# Patient Record
Sex: FEMALE | Race: WHITE | NOT HISPANIC OR LATINO
[De-identification: names, ages, dates, MRNs, and addresses within clinical notes are randomized per-mention and may not be internally consistent; named-entity substitution may affect disease eponyms.]

---

## 2022-04-29 PROBLEM — Z00.00 ENCOUNTER FOR PREVENTIVE HEALTH EXAMINATION: Status: ACTIVE | Noted: 2022-04-29

## 2022-06-13 ENCOUNTER — TRANSCRIPTION ENCOUNTER (OUTPATIENT)
Age: 62
End: 2022-06-13

## 2022-06-13 ENCOUNTER — APPOINTMENT (OUTPATIENT)
Dept: NEUROSURGERY | Facility: CLINIC | Age: 62
End: 2022-06-13
Payer: SELF-PAY

## 2022-06-13 PROCEDURE — EDU01: CPT

## 2022-06-14 ENCOUNTER — APPOINTMENT (OUTPATIENT)
Dept: NEUROSURGERY | Facility: CLINIC | Age: 62
End: 2022-06-14

## 2022-06-15 NOTE — HISTORY OF PRESENT ILLNESS
[de-identified] : I conducted a remote educational consult with CASPER VEGA on 06/13/2022. I explained that I am only able to provide an educational consult and not render treatment as I am not licensed in the state in which [place] is located. A written informed consent for the educational consult was obtained and is included in the EHR. CASPER VEGA is informed that there would be a $250 cost associated with the conduct of the remote educational consult. \par \par Ms. VEGA is a pleasant 61 year old female who presents today with a chief complaint of bilateral (R>L) ear pulsatile tinnitus.  It first started 2009.  Since that time it has been getting progressively worse.  It is described as a constant, whooshing sound that is in sync with her pulse.  Pressing on either side of her neck does not change the sound.\par \par She has been seen by ENT in the past and had high frequency hearing loss as per patient.  She was referred to Dr. Villagran. \par \par She has a history of headaches which have also been getting worse over the last couple of years.  They are worse with laying flat.\par \par She denies any blurry vision or diplopia.\par \par She has never had a lumbar puncture.

## 2022-06-15 NOTE — REASON FOR VISIT
[Home] : at home, [unfilled] , at the time of the visit. [Medical Office: ([facility])___] : at the medical office located in  [Patient] : the patient [Self] : self [New Patient Visit] : a new patient visit [FreeTextEntry1] : Pulsatile Tinnitus

## 2022-06-15 NOTE — ASSESSMENT
[FreeTextEntry1] : Impression:\par Bilateral (R>L) Pulsatile Tinnitus\par NO CHANGE with neck pressure on either side\par Chronic Headaches\par No Vision Complaints\par \par We discussed possible causes of pulsatile tinnitus including:\par ENT causes such as semicircular canal dehiscence, tumor, ototoxicity\par Cardiac causes such as aortic stenosis, valve disease, HTN, other causes of heart murmur\par Anemia\par Thyroid disease\par Cerebrovascular causes such as arteriovenous malformation (AVM), dural arteriovenous fistula (dAVF), venous sinus stenosis, venous aneurysm, large trans-mastoid emissary vein, carotid stenosis, jugular bulb diverticulum \par \par MRA 4/2022 reveals no dural AVF or AVM\par \par I reassured her that based on the MRA there is no life threatening cause of her pulsatile tinnitus.  She needs further imaging to assess the intracranial venous system. \par \par Plan:\par MRV Head w/wo to r/o venous sinus stenosis or venous aneurysm\par Follow Up with Me After Imaging

## 2022-08-09 ENCOUNTER — APPOINTMENT (OUTPATIENT)
Dept: NEUROSURGERY | Facility: CLINIC | Age: 62
End: 2022-08-09

## 2022-08-09 PROCEDURE — 99442: CPT

## 2022-08-16 ENCOUNTER — TRANSCRIPTION ENCOUNTER (OUTPATIENT)
Age: 62
End: 2022-08-16

## 2022-09-01 NOTE — REASON FOR VISIT
[Home] : at home, [unfilled] , at the time of the visit. [Medical Office: ([facility])___] : at the medical office located in  [Verbal consent obtained from patient] : the patient, [unfilled] [Follow-Up: _____] : a [unfilled] follow-up visit [FreeTextEntry1] : Review results of MRV Head w/wo to r/o venous sinus stenosis or venous aneurysm
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
## 2022-09-01 NOTE — ASSESSMENT
[FreeTextEntry1] : Impression:\par Bilateral (R>L) Pulsatile Tinnitus\par NO CHANGE with neck pressure on either side\par Chronic Headaches\par No Vision Complaints\par \par We discussed possible causes of pulsatile tinnitus including:\par ENT causes such as semicircular canal dehiscence, tumor, ototoxicity\par Cardiac causes such as aortic stenosis, valve disease, HTN, other causes of heart murmur\par Anemia\par Thyroid disease\par Cerebrovascular causes such as arteriovenous malformation (AVM), dural arteriovenous fistula (dAVF), venous sinus stenosis, venous aneurysm, large trans-mastoid emissary vein, carotid stenosis, jugular bulb diverticulum \par \par MRA 4/2022 reveals no dural AVF or AVM\par I reassured her that based on the MRA there is no life threatening cause of her pulsatile tinnitus.  \par MRV 4/2022 imaging unclear\par \par Plan:\par Repeat MRV Head w/wo to r/o venous sinus stenosis or venous aneurysm\par Recommend Physical Therapy of the cervical spine and TMJ, to improve the alignment of the cervical spine\par Recommend maxillofacial release therapy (MFRT)\par Follow Up with Me After Imaging and PT

## 2022-12-08 ENCOUNTER — NON-APPOINTMENT (OUTPATIENT)
Age: 62
End: 2022-12-08
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
## 2022-12-08 NOTE — ASSESSMENT
[FreeTextEntry1] : Impression:\par Bilateral (R>L) Pulsatile Tinnitus\par NO CHANGE with neck pressure on either side\par Chronic Headaches\par Worsening Dizziness\par \par MRA 4/2022 reveals no dural AVF or AVM\par I reassured her that based on the MRA there is no life threatening cause of her pulsatile tinnitus.  \par MRV 4/2022 imaging unclear\par \par MRV 10/2022 reveals a LEFT dominant system, no transverse sinus stenosis; left jugular bulb diverticulum\par \par While a jugular bulb diverticulum can cause pulsatile tinnitus, it is usually unilateral or at least worse on the side of the diverticulum.  I reassured her that there is no life threatening cause of pulsatile tinnitus noted.  I recommend continued follow up with ENT, specifically neuro-otology, for worsening dizziness and pulsatile tinnitus.\par \par If the pulsatile tinnitus worsens or becomes debilitating, we discussed the possibility of further work up with catheter angiogram/venogram/BTO.  I would like her to exhaust ENT possibilities before proceeding.  \par \par Plan:\par Evaluation by Neuro-Otology\par Follow Up with Me As Needed If She is Considering Catheter Venogram/Angiogram/BTO\par \par Dr. Epperson discussed results and recommendations with patient over the phone.

## 2023-06-13 ENCOUNTER — OUTPATIENT (OUTPATIENT)
Dept: OUTPATIENT SERVICES | Facility: HOSPITAL | Age: 63
LOS: 1 days | End: 2023-06-13
Payer: COMMERCIAL

## 2023-06-13 ENCOUNTER — APPOINTMENT (OUTPATIENT)
Dept: NEUROSURGERY | Facility: CLINIC | Age: 63
End: 2023-06-13
Payer: COMMERCIAL

## 2023-06-13 VITALS
OXYGEN SATURATION: 97 % | DIASTOLIC BLOOD PRESSURE: 85 MMHG | SYSTOLIC BLOOD PRESSURE: 129 MMHG | WEIGHT: 169.98 LBS | HEIGHT: 66 IN | HEART RATE: 66 BPM | RESPIRATION RATE: 15 BRPM | TEMPERATURE: 98 F

## 2023-06-13 DIAGNOSIS — Z98.890 OTHER SPECIFIED POSTPROCEDURAL STATES: Chronic | ICD-10-CM

## 2023-06-13 DIAGNOSIS — Z90.49 ACQUIRED ABSENCE OF OTHER SPECIFIED PARTS OF DIGESTIVE TRACT: Chronic | ICD-10-CM

## 2023-06-13 DIAGNOSIS — H93.A9 PULSATILE TINNITUS, UNSPECIFIED EAR: ICD-10-CM

## 2023-06-13 DIAGNOSIS — Z87.81 PERSONAL HISTORY OF (HEALED) TRAUMATIC FRACTURE: Chronic | ICD-10-CM

## 2023-06-13 DIAGNOSIS — Z96.652 PRESENCE OF LEFT ARTIFICIAL KNEE JOINT: Chronic | ICD-10-CM

## 2023-06-13 DIAGNOSIS — Z01.818 ENCOUNTER FOR OTHER PREPROCEDURAL EXAMINATION: ICD-10-CM

## 2023-06-13 DIAGNOSIS — Z90.721 ACQUIRED ABSENCE OF OVARIES, UNILATERAL: Chronic | ICD-10-CM

## 2023-06-13 LAB
ANION GAP SERPL CALC-SCNC: 12 MMOL/L — SIGNIFICANT CHANGE UP (ref 5–17)
APTT BLD: 30.9 SEC — SIGNIFICANT CHANGE UP (ref 27.5–35.5)
BLD GP AB SCN SERPL QL: NEGATIVE — SIGNIFICANT CHANGE UP
BUN SERPL-MCNC: 22 MG/DL — SIGNIFICANT CHANGE UP (ref 7–23)
CALCIUM SERPL-MCNC: 9.6 MG/DL — SIGNIFICANT CHANGE UP (ref 8.4–10.5)
CHLORIDE SERPL-SCNC: 108 MMOL/L — SIGNIFICANT CHANGE UP (ref 96–108)
CO2 SERPL-SCNC: 23 MMOL/L — SIGNIFICANT CHANGE UP (ref 22–31)
CREAT SERPL-MCNC: 0.72 MG/DL — SIGNIFICANT CHANGE UP (ref 0.5–1.3)
EGFR: 96 ML/MIN/1.73M2 — SIGNIFICANT CHANGE UP
GLUCOSE SERPL-MCNC: 96 MG/DL — SIGNIFICANT CHANGE UP (ref 70–99)
HCT VFR BLD CALC: 42.9 % — SIGNIFICANT CHANGE UP (ref 34.5–45)
HGB BLD-MCNC: 14.1 G/DL — SIGNIFICANT CHANGE UP (ref 11.5–15.5)
INR BLD: 0.94 RATIO — SIGNIFICANT CHANGE UP (ref 0.88–1.16)
MCHC RBC-ENTMCNC: 31.1 PG — SIGNIFICANT CHANGE UP (ref 27–34)
MCHC RBC-ENTMCNC: 32.9 GM/DL — SIGNIFICANT CHANGE UP (ref 32–36)
MCV RBC AUTO: 94.5 FL — SIGNIFICANT CHANGE UP (ref 80–100)
NRBC # BLD: 0 /100 WBCS — SIGNIFICANT CHANGE UP (ref 0–0)
PLATELET # BLD AUTO: 338 K/UL — SIGNIFICANT CHANGE UP (ref 150–400)
POTASSIUM SERPL-MCNC: 4.3 MMOL/L — SIGNIFICANT CHANGE UP (ref 3.5–5.3)
POTASSIUM SERPL-SCNC: 4.3 MMOL/L — SIGNIFICANT CHANGE UP (ref 3.5–5.3)
PROTHROM AB SERPL-ACNC: 10.9 SEC — SIGNIFICANT CHANGE UP (ref 10.5–13.4)
RBC # BLD: 4.54 M/UL — SIGNIFICANT CHANGE UP (ref 3.8–5.2)
RBC # FLD: 12.9 % — SIGNIFICANT CHANGE UP (ref 10.3–14.5)
RH IG SCN BLD-IMP: POSITIVE — SIGNIFICANT CHANGE UP
SODIUM SERPL-SCNC: 143 MMOL/L — SIGNIFICANT CHANGE UP (ref 135–145)
WBC # BLD: 6.88 K/UL — SIGNIFICANT CHANGE UP (ref 3.8–10.5)
WBC # FLD AUTO: 6.88 K/UL — SIGNIFICANT CHANGE UP (ref 3.8–10.5)

## 2023-06-13 PROCEDURE — G0463: CPT

## 2023-06-13 PROCEDURE — 99214 OFFICE O/P EST MOD 30 MIN: CPT

## 2023-06-13 PROCEDURE — 86901 BLOOD TYPING SEROLOGIC RH(D): CPT

## 2023-06-13 PROCEDURE — 86900 BLOOD TYPING SEROLOGIC ABO: CPT

## 2023-06-13 PROCEDURE — 36415 COLL VENOUS BLD VENIPUNCTURE: CPT

## 2023-06-13 PROCEDURE — 86850 RBC ANTIBODY SCREEN: CPT

## 2023-06-13 PROCEDURE — 85610 PROTHROMBIN TIME: CPT

## 2023-06-13 PROCEDURE — 85730 THROMBOPLASTIN TIME PARTIAL: CPT

## 2023-06-13 PROCEDURE — 85027 COMPLETE CBC AUTOMATED: CPT

## 2023-06-13 PROCEDURE — 80048 BASIC METABOLIC PNL TOTAL CA: CPT

## 2023-06-13 RX ORDER — TOPIRAMATE 50 MG/1
TABLET, COATED ORAL
Refills: 0 | Status: ACTIVE | COMMUNITY

## 2023-06-13 RX ORDER — OMEPRAZOLE 20 MG/1
TABLET, DELAYED RELEASE ORAL
Refills: 0 | Status: ACTIVE | COMMUNITY

## 2023-06-13 RX ORDER — NABUMETONE 750 MG/1
TABLET, FILM COATED ORAL
Refills: 0 | Status: ACTIVE | COMMUNITY

## 2023-06-13 RX ORDER — ONDANSETRON HYDROCHLORIDE 4 MG/1
TABLET, FILM COATED ORAL
Refills: 0 | Status: ACTIVE | COMMUNITY

## 2023-06-13 RX ORDER — DIPHENHYDRAMINE HCL 50 MG/1
50 CAPSULE ORAL
Qty: 1 | Refills: 0 | Status: ACTIVE | COMMUNITY
Start: 2023-06-13 | End: 1900-01-01

## 2023-06-13 RX ORDER — ROSUVASTATIN CALCIUM 5 MG/1
TABLET, FILM COATED ORAL
Refills: 0 | Status: ACTIVE | COMMUNITY

## 2023-06-13 RX ORDER — ALPRAZOLAM 2 MG/1
TABLET ORAL
Refills: 0 | Status: ACTIVE | COMMUNITY

## 2023-06-13 RX ORDER — FEXOFENADINE HCL 60 MG
TABLET ORAL
Refills: 0 | Status: ACTIVE | COMMUNITY

## 2023-06-13 RX ORDER — ATENOLOL 50 MG/1
TABLET ORAL
Refills: 0 | Status: ACTIVE | COMMUNITY

## 2023-06-13 RX ORDER — PREDNISONE 50 MG/1
50 TABLET ORAL
Qty: 3 | Refills: 0 | Status: ACTIVE | COMMUNITY
Start: 2023-06-13 | End: 1900-01-01

## 2023-06-13 RX ORDER — DULOXETINE HYDROCHLORIDE 60 MG/1
60 CAPSULE, DELAYED RELEASE ORAL
Refills: 0 | Status: ACTIVE | COMMUNITY

## 2023-06-13 NOTE — H&P PST ADULT - PROBLEM SELECTOR PLAN 1
Procedure: Cerebral angiogram/venogram 6/14/23  PST labs pending  AC: None  Medication changes: None

## 2023-06-13 NOTE — H&P PST ADULT - NSICDXPASTMEDICALHX_GEN_ALL_CORE_FT
PAST MEDICAL HISTORY:  H/O headache     H/O tinnitus     HLD (hyperlipidemia)     Kidney stones

## 2023-06-13 NOTE — H&P PST ADULT - NSICDXPASTSURGICALHX_GEN_ALL_CORE_FT
PAST SURGICAL HISTORY:  H/O carpal tunnel repair     H/O fracture of tibia     H/O right knee surgery     H/O total knee replacement, left     S/P appendectomy     S/P laparotomy     S/P right oophorectomy     S/P trigger finger release

## 2023-06-13 NOTE — H&P PST ADULT - NEGATIVE NEUROLOGICAL SYMPTOMS
no transient paralysis/no weakness/no paresthesias/no generalized seizures/no focal seizures/no syncope/no tremors/no loss of sensation/no difficulty walking

## 2023-06-13 NOTE — H&P PST ADULT - FUNCTIONAL STATUS
DASI score- 5.29-Able to walk 20 minutes, 1 flight of stairs, ADL's- Denies SOB- limited activity 2/2 ankle pain/4-10 METS
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
## 2023-06-13 NOTE — PHYSICAL EXAM
[General Appearance - Alert] : alert [General Appearance - In No Acute Distress] : in no acute distress [General Appearance - Well-Appearing] : healthy appearing [Oriented To Time, Place, And Person] : oriented to person, place, and time [Person] : oriented to person [Place] : oriented to place [Time] : oriented to time [Motor Tone] : muscle tone was normal in all four extremities [Intact] : all motor groups within normal limits of strength and tone bilaterally [Sclera] : the sclera and conjunctiva were normal [Outer Ear] : the ears and nose were normal in appearance [] : no respiratory distress [Neck Appearance] : the appearance of the neck was normal [Abnormal Walk] : normal gait [Skin Color & Pigmentation] : normal skin color and pigmentation

## 2023-06-14 ENCOUNTER — APPOINTMENT (OUTPATIENT)
Dept: NEUROSURGERY | Facility: HOSPITAL | Age: 63
End: 2023-06-14
Payer: COMMERCIAL

## 2023-06-14 ENCOUNTER — OUTPATIENT (OUTPATIENT)
Dept: OUTPATIENT SERVICES | Facility: HOSPITAL | Age: 63
LOS: 1 days | End: 2023-06-14
Payer: COMMERCIAL

## 2023-06-14 ENCOUNTER — TRANSCRIPTION ENCOUNTER (OUTPATIENT)
Age: 63
End: 2023-06-14

## 2023-06-14 ENCOUNTER — RESULT REVIEW (OUTPATIENT)
Age: 63
End: 2023-06-14

## 2023-06-14 VITALS
SYSTOLIC BLOOD PRESSURE: 122 MMHG | DIASTOLIC BLOOD PRESSURE: 75 MMHG | HEART RATE: 85 BPM | OXYGEN SATURATION: 98 % | RESPIRATION RATE: 16 BRPM

## 2023-06-14 VITALS
RESPIRATION RATE: 18 BRPM | SYSTOLIC BLOOD PRESSURE: 156 MMHG | WEIGHT: 169.98 LBS | HEART RATE: 86 BPM | TEMPERATURE: 98 F | OXYGEN SATURATION: 95 % | DIASTOLIC BLOOD PRESSURE: 97 MMHG | HEIGHT: 66.5 IN

## 2023-06-14 DIAGNOSIS — Z98.890 OTHER SPECIFIED POSTPROCEDURAL STATES: Chronic | ICD-10-CM

## 2023-06-14 DIAGNOSIS — Z87.81 PERSONAL HISTORY OF (HEALED) TRAUMATIC FRACTURE: Chronic | ICD-10-CM

## 2023-06-14 DIAGNOSIS — Z90.49 ACQUIRED ABSENCE OF OTHER SPECIFIED PARTS OF DIGESTIVE TRACT: Chronic | ICD-10-CM

## 2023-06-14 DIAGNOSIS — Z90.721 ACQUIRED ABSENCE OF OVARIES, UNILATERAL: Chronic | ICD-10-CM

## 2023-06-14 DIAGNOSIS — Z96.652 PRESENCE OF LEFT ARTIFICIAL KNEE JOINT: Chronic | ICD-10-CM

## 2023-06-14 DIAGNOSIS — H93.A3 PULSATILE TINNITUS, BILATERAL: ICD-10-CM

## 2023-06-14 DIAGNOSIS — H93.A9 PULSATILE TINNITUS, UNSPECIFIED EAR: ICD-10-CM

## 2023-06-14 PROCEDURE — C1760: CPT

## 2023-06-14 PROCEDURE — 64999 UNLISTED PX NERVOUS SYSTEM: CPT

## 2023-06-14 PROCEDURE — C1769: CPT

## 2023-06-14 PROCEDURE — C2628: CPT

## 2023-06-14 PROCEDURE — 61623 EVASC TEMP BALO ARTL OCC H/N: CPT

## 2023-06-14 PROCEDURE — C1894: CPT

## 2023-06-14 PROCEDURE — C1887: CPT

## 2023-06-14 RX ORDER — ONDANSETRON 8 MG/1
1 TABLET, FILM COATED ORAL
Refills: 0 | DISCHARGE

## 2023-06-14 RX ORDER — TOPIRAMATE 25 MG
1 TABLET ORAL
Refills: 0 | DISCHARGE

## 2023-06-14 RX ORDER — KETOROLAC TROMETHAMINE 30 MG/ML
30 SYRINGE (ML) INJECTION ONCE
Refills: 0 | Status: DISCONTINUED | OUTPATIENT
Start: 2023-06-14 | End: 2023-06-14

## 2023-06-14 RX ORDER — FEXOFENADINE HCL 30 MG
1 TABLET ORAL
Refills: 0 | DISCHARGE

## 2023-06-14 RX ORDER — ATENOLOL 25 MG/1
1 TABLET ORAL
Refills: 0 | DISCHARGE

## 2023-06-14 RX ORDER — NABUMETONE 750 MG
2 TABLET ORAL
Refills: 0 | DISCHARGE

## 2023-06-14 RX ORDER — DIPHENHYDRAMINE HCL 50 MG
1 CAPSULE ORAL
Refills: 0 | DISCHARGE

## 2023-06-14 RX ORDER — DULOXETINE HYDROCHLORIDE 30 MG/1
1 CAPSULE, DELAYED RELEASE ORAL
Refills: 0 | DISCHARGE

## 2023-06-14 RX ORDER — HYDROMORPHONE HYDROCHLORIDE 2 MG/ML
0.5 INJECTION INTRAMUSCULAR; INTRAVENOUS; SUBCUTANEOUS
Refills: 0 | Status: DISCONTINUED | OUTPATIENT
Start: 2023-06-14 | End: 2023-06-14

## 2023-06-14 RX ORDER — ROSUVASTATIN CALCIUM 5 MG/1
1 TABLET ORAL
Refills: 0 | DISCHARGE

## 2023-06-14 RX ORDER — OMEPRAZOLE 10 MG/1
1 CAPSULE, DELAYED RELEASE ORAL
Refills: 0 | DISCHARGE

## 2023-06-14 RX ORDER — ONDANSETRON 8 MG/1
4 TABLET, FILM COATED ORAL ONCE
Refills: 0 | Status: DISCONTINUED | OUTPATIENT
Start: 2023-06-14 | End: 2023-06-28

## 2023-06-14 RX ORDER — PREDNISOLONE 5 MG
1 TABLET ORAL
Refills: 0 | DISCHARGE

## 2023-06-14 RX ORDER — ALPRAZOLAM 0.25 MG
1 TABLET ORAL
Refills: 0 | DISCHARGE

## 2023-06-14 RX ORDER — SODIUM CHLORIDE 9 MG/ML
1000 INJECTION INTRAMUSCULAR; INTRAVENOUS; SUBCUTANEOUS
Refills: 0 | Status: DISCONTINUED | OUTPATIENT
Start: 2023-06-14 | End: 2023-06-28

## 2023-06-14 RX ADMIN — Medication 30 MILLIGRAM(S): at 17:01

## 2023-06-14 NOTE — ASU DISCHARGE PLAN (ADULT/PEDIATRIC) - NS MD DC FALL RISK RISK
For information on Fall & Injury Prevention, visit: [email or web address] OR  [email or web address] OR  https://www.cdc.gov/steadi/patient.html
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
## 2023-06-14 NOTE — CHART NOTE - NSCHARTNOTEFT_GEN_A_CORE
Interventional Neuro- Radiology   Procedure Note PA-KASHIF    Procedure: Catheter Cerebral venogram, angiogram, manometry, and balloon test occlusion    Pre- Procedure Diagnosis:  Post- Procedure Diagnosis:    : Dr Aziza Epperson  Fellow:     Dr Cristino Salvador   Physician Assistant: Andria Bartlett PA-C    Nurse:  Radiologic Tech:  Anesthesiologist:  Sheath:      I/Os: EBL less than 10cc  IV fluids:     cc  Urine due to void  Contrast Omnipaque 240      cc             Vitals: BP         HR      Spo2     %          Preliminary Report:  Using a 5 Thai long sheath to the right groin under MAC sedation via left vertebral artery,  left internal carotid artery, left external carotid artery, right vertebral artery, right internal carotid artery, right external carotid artery a selective cerebral angiography was performed and demonstrated                       Official note to follow.  Patient tolerated procedure well, hemodynamically stable, no change in neurological status compared to baseline.  Results discussed with neuro ICU team, patient and patient's family. Right groin sheath was removed, manual compression held to hemostasis for 20 minutes, no active bleeding, no hematoma, quick clot and safeguard balloon dressing applied at Interventional Neuro- Radiology   Procedure Note PA-C    Procedure: Catheter Cerebral venogram, angiogram, manometry, and balloon test occlusion    Pre- Procedure Diagnosis:  Post- Procedure Diagnosis:    : Dr Aziza Epperson  Fellow:     Dr Cristino Salvador   Physician Assistant: Andria Bartlett PA-C    Nurse:                   Susie Whitt RN   Radiologic Tech:   Rehan Pineda LRT  Anesthesiologist:   Dr John Garcia   Sheath:  Sheath:       I/Os: EBL less than 10cc  IV fluids:     cc  Urine due to void  Contrast Omnipaque 240      cc             Vitals: BP         HR      Spo2 100%          Preliminary Report:  Using a 8 Indonesian short sheath to the right groin under MAC sedation a selective cerebral angiography was performed and demonstrated                       Official note to follow.  Patient tolerated procedure well, hemodynamically stable, no change in neurological status compared to baseline. Results discussed with patient and patient's family. Right groin sheath was removed, a Vascade device and manual compression held to hemostasis for 20 minutes, no active bleeding, no hematoma, quick clot and safeguard balloon dressing applied at Interventional Neuro- Radiology   Procedure Note PA-C    Procedure: Catheter Cerebral venogram, angiogram, manometry, and balloon test occlusion    Pre- Procedure Diagnosis:  Post- Procedure Diagnosis:    : Dr Aziza Epperson  Fellow:     Dr Cristino Salvador   Physician Assistant: Andria Bartlett PA-C    Nurse:                   Susie Whitt RN   Radiologic Tech:   Rehan Pineda LRT  Anesthesiologist:   Dr John Garcia   Sheath:                  8 Micronesian short sheath   Sheath:       I/Os: EBL less than 10cc  IV fluids:     cc  Urine due to void  Contrast Omnipaque 240                 Vitals: BP         HR      Spo2 100%          Preliminary Report:  Using a 8 Micronesian short sheath to the right groin under MAC sedation a selective cerebral angiography was performed and demonstrated                       Official note to follow.  Patient tolerated procedure well, hemodynamically stable, no change in neurological status compared to baseline. Results discussed with patient and patient's family. Right groin sheath was removed, a Vascade device and manual compression held to hemostasis for 20 minutes, no active bleeding, no hematoma, quick clot and safeguard balloon dressing applied at Interventional Neuro- Radiology   Procedure Note PA-C    Procedure: Catheter Cerebral venogram, angiogram, manometry, and balloon test occlusion    Pre- Procedure Diagnosis: Bilateral pulsatile tinnitus   Post- Procedure Diagnosis:    : Dr Aziza Epperson  Fellow:     Dr Cristino Salvador   Physician Assistant: Andria Bartlett PA-C    Nurse:                   Susie Whitt RN   Radiologic Tech:   Rehan Pineda LRT  Anesthesiologist:   Dr John Garcia   Sheath:                  8 Georgian short sheath   Sheath:                  5/4 Georgian short sheath       I/Os: EBL less than 10cc  IV fluids: 100cc  Urine due to void  Contrast Omnipaque 240  56cc               Vitals: /72        HR 70      Spo2 100%          Preliminary Report:  Using a 8 Georgian short sheath to the right femoral vein and a 5/4 Georgian sheath to the radial artery under MAC sedation a catheter cerebral venogram, angiogram, manometry ad balloon test occlusion was performed. Official note to follow.  Patient tolerated procedure well, hemodynamically stable, no change in neurological status compared to baseline. Results discussed with patient and patient's sister. Right groin sheath was removed, a Vascade device and manual compression held to hemostasis for 20 minutes, no active bleeding, no hematoma, quick clot and safeguard balloon dressing applied at 1600 hours. Wrist band was placed at 1600 hours. Interventional Neuro- Radiology   Procedure Note PA-C    Procedure: Catheter Cerebral venogram, angiogram, manometry, and balloon test occlusion    Pre- Procedure Diagnosis: Bilateral pulsatile tinnitus   Post- Procedure Diagnosis: No clear evidence of vascular pathology     : Dr Aziza Epperson  Fellow:     Dr Cristino Salvador   Physician Assistant: Andria Bartlett PA-C  Resident:                Dr Evangelist Tucker     Nurse:                   Susie Whitt RN   Radiologic Tech:   Rehan Pineda LRT  Anesthesiologist:   Dr John Garcia   Sheath:                  8 Portuguese short sheath   Sheath:                  5/4 Portuguese short sheath       I/Os: EBL less than 10cc  IV fluids: 100cc  Urine due to void  Contrast Omnipaque 240  56cc               Vitals: /72        HR 70      Spo2 100%          Preliminary Report:  Using a 8 Portuguese short sheath to the right femoral vein and a 5/4 Portuguese sheath to the radial artery under MAC sedation a catheter cerebral venogram, angiogram, manometry ad balloon test occlusion was performed. No aneurysm, no fistulas and no AVMS. Official note to follow.  Patient tolerated procedure well, hemodynamically stable, no change in neurological status compared to baseline. Results discussed with patient and patient's sister. Right groin sheath was removed, a Vascade device and manual compression held to hemostasis for 20 minutes, no active bleeding, no hematoma, quick clot and safeguard balloon dressing applied at 1600 hours. Wrist band was placed at 1600 hours.

## 2023-06-14 NOTE — ASU DISCHARGE PLAN (ADULT/PEDIATRIC) - FOLLOW UP APPOINTMENTS
"Last Written Prescription Date:  8/21/21  Last Fill Quantity: 30,  # refills: 3   Last office visit provider:  5/10/22     Requested Prescriptions   Pending Prescriptions Disp Refills     meclizine (ANTIVERT) 25 MG tablet [Pharmacy Med Name: Meclizine HCl Oral Tablet 25 MG] 30 tablet 0     Sig: Take 1 tablet via G-tube every 6 hours as needed for vertigo        Antivertigo/Antiemetic Agents Passed - 5/29/2022 12:16 PM        Passed - Recent (12 mo) or future (30 days) visit within the authorizing provider's specialty     Patient has had an office visit with the authorizing provider or a provider within the authorizing providers department within the previous 12 mos or has a future within next 30 days. See \"Patient Info\" tab in inbasket, or \"Choose Columns\" in Meds & Orders section of the refill encounter.              Passed - Medication is active on med list        Passed - Patient is 18 years of age or older           FLOWFLEX COVID-19 AG HOME TEST KIT [Pharmacy Med Name: Flowflex COVID-19 Ag Home Test In Vitro Kit] 8 kit 0     Sig: USE AS DIRECTED       There is no refill protocol information for this order          María Elena Byrd RN 05/30/22 7:01 PM  "
"Routing refill request to provider for review/approval because:  Drug not on the [facility] [place] [facility]     Last Written Prescription Date:    Last Fill Quantity: ,  # refills:    Last office visit provider:  5/10/22     Requested Prescriptions   Pending Prescriptions Disp Refills     FLOWFLEX COVID-19 AG HOME TEST KIT [Pharmacy Med Name: Flowflex COVID-19 Ag Home Test In Vitro Kit] 8 kit 0     Sig: USE AS DIRECTED       There is no refill protocol information for this order      Signed Prescriptions Disp Refills    meclizine (ANTIVERT) 25 MG tablet 30 tablet 11     Sig: Take 1 tablet via G-tube every 6 hours as needed for vertigo        Antivertigo/Antiemetic Agents Passed - 5/29/2022 12:16 PM        Passed - Recent (12 mo) or future (30 days) visit within the authorizing provider's specialty     Patient has had an office visit with the authorizing provider or a provider within the authorizing providers department within the previous 12 mos or has a future within next 30 days. See \"Patient Info\" tab in inbasket, or \"Choose Columns\" in Meds & Orders section of the refill encounter.              Passed - Medication is active on med list        Passed - Patient is 18 years of age or older             María Elena Byrd RN 05/30/22 7:01 PM  "
[id number]

## 2023-06-14 NOTE — CHART NOTE - NSCHARTNOTEFT_GEN_A_CORE
Interventional Neuro Radiology  Pre-Procedure Note PA-C    This is a 62y ____ hand dominant Female      HPI:      Allergies: codeine (Vomiting; Nausea)  Iodine contrast (Rash)  latex (Rash)      PAST MEDICAL & SURGICAL HISTORY:  H/O tinnitus      H/O headache      HLD (hyperlipidemia)      Kidney stones      S/P laparotomy      S/P right oophorectomy      S/P appendectomy      H/O fracture of tibia      H/O total knee replacement, left      H/O right knee surgery      H/O carpal tunnel repair      S/P trigger finger release          Social History:   FAMILY HISTORY:  Current Medications:     Labs:                         14.1   6.88  )-----------( 338      ( 13 Jun 2023 16:38 )             42.9       06-13    143  |  108  |  22  ----------------------------<  96  4.3   |  23  |  0.72    Ca    9.6      13 Jun 2023 16:37            Blood Bank: 0 positive         Assessment/Plan:   This is a 62 year old right hand dominant female    Patient presents to neuro-IR for selective cerebral angiography.   Procedure, goals, risks, benefits and alternatives  were discussed with patient and patient's family.  All questions were answered.  Risks include but are not limited to stroke, vessel injury, hemorrhage, and or right groin hematoma. Patient demonstrates understanding of all risks involved with this procedure and wishes to continue.  Appropriate consent was obtained from patient and consent was in patient's chart. Interventional Neuro Radiology  Pre-Procedure Note PA-C    This is a 62 year old right hand dominant Female      HPI:      Allergies: codeine (Vomiting; Nausea)  Iodine contrast (Rash)  latex (Rash)    PMHX: Tinnitus, headache, Hyperlipidemia, Kidney stones  PMHX: Laparotomy, right oophorectomy, appendectomy, fracture of tibia, left total knee replacement, left, right knee surgery  Social History:   FAMILY HISTORY:  Current Medications:     Labs:                         14.1   6.88  )-----------( 338      ( 13 Jun 2023 16:38 )             42.9       06-13    143  |  108  |  22  ----------------------------<  96  4.3   |  23  |  0.72    Ca    9.6      13 Jun 2023 16:37        Blood Bank: 0 positive     Assessment/Plan:   This is a 62 year old right hand dominant female    Patient presents to neuro-IR for selective cerebral angiography.   Procedure, goals, risks, benefits and alternatives were discussed with patient and patient's family.  All questions were answered.  Risks include but are not limited to stroke, vessel injury, hemorrhage, and or right groin hematoma. Patient demonstrates understanding of all risks involved with this procedure and wishes to continue. Appropriate consent was obtained from patient and consent was in patient's chart. Interventional Neuro Radiology  Pre-Procedure Note PA-C    This is a 62 year old right hand dominant female        Allergies: codeine (Vomiting; Nausea)  Iodine contrast (Rash)  latex (Rash)    PMHX: Tinnitus, headache, Hyperlipidemia, Kidney stones  PMHX: Laparotomy, right oophorectomy, appendectomy, fracture of tibia, left total knee replacement, left, right knee surgery  Social History:   FAMILY HISTORY:  Current Medications:     Labs:                         14.1   6.88  )-----------( 338      ( 13 Jun 2023 16:38 )             42.9       06-13    143  |  108  |  22  ----------------------------<  96  4.3   |  23  |  0.72    Ca    9.6      13 Jun 2023 16:37        Blood Bank: 0 positive     Assessment/Plan:   This is a 62 year old right hand dominant female    Patient presents to neuro-IR for selective cerebral angiography.   Procedure, goals, risks, benefits and alternatives were discussed with patient and patient's family.  All questions were answered.  Risks include but are not limited to stroke, vessel injury, hemorrhage, and or right groin hematoma. Patient demonstrates understanding of all risks involved with this procedure and wishes to continue. Appropriate consent was obtained from patient and consent was in patient's chart. Interventional Neuro Radiology  Pre-Procedure Note PA-C    This is a 62 year old right hand dominant female with bilateral pulsatile tinnitus who presents to Neuro IR for a catheter cerebral angiogram, accompanied by her sister.     Upon exam patient is A + O x 3, speech is fluent, thought process is coherent, follows commands, moves all extremities and ambulates without assist.      Allergies: codeine (Vomiting; Nausea)  Iodine contrast (Rash)  latex (Rash)    PMHX: Tinnitus, headache, Hyperlipidemia, Kidney stones  PMHX: Laparotomy, right oophorectomy, appendectomy, fracture of tibia, left total knee replacement, left, right knee surgery  Social History: Non-tobacco smoker   FAMILY HISTORY: Non-contributory   Current Medications:     Labs:                         14.1   6.88  )-----------( 338      ( 13 Jun 2023 16:38 )             42.9       06-13    143  |  108  |  22  ----------------------------<  96  4.3   |  23  |  0.72    Ca    9.6      13 Jun 2023 16:37      Blood Bank: 0 positive     Assessment/Plan:   This is a 62 year old right hand dominant female with tinnitus, who presents to Neuro-IR for a catheter cerebral angiography. Procedure, goals, risks, benefits and alternatives were discussed with patient and patient's sister. All questions were answered. Risks include but are not limited to stroke, vessel injury, hemorrhage, and or right groin hematoma. Patient demonstrates understanding of all risks involved with this procedure and wishes to continue. Appropriate consent was obtained from patient and consent was in patient's chart.

## 2023-06-14 NOTE — ASU PATIENT PROFILE, ADULT - FALL HARM RISK - UNIVERSAL INTERVENTIONS
Bed in lowest position, wheels locked, appropriate side rails in place/Call bell, personal items and telephone in reach/Instruct patient to call for assistance before getting out of bed or chair/Non-slip footwear when patient is out of bed/Stow to call system/Physically safe environment - no spills, clutter or unnecessary equipment/Purposeful Proactive Rounding/Room/bathroom lighting operational, light cord in reach

## 2023-06-14 NOTE — ASSESSMENT
[FreeTextEntry1] : Impression:\par Bilateral (R>L) Pulsatile Tinnitus\par NO CHANGE with neck pressure on either side\par Chronic Headaches\par Worsening Dizziness\par \par MRA 4/2022 reveals no dural AVF or AVM  \par MRV 4/2022 imaging unclear\par \par MRV 10/2022 reveals a LEFT dominant system, no transverse sinus stenosis; left jugular bulb diverticulum\par \par While a jugular bulb diverticulum can cause pulsatile tinnitus, it is usually unilateral or at least worse on the side of the diverticulum.  I reassured her that there is no life threatening cause of pulsatile tinnitus noted.\par \par The pulsatile tinnitus is debilitating, we discussed the possibility of further work up with catheter angiogram/venogram/BTO since she has exhausted her ENT possibilities and she would like to proceed. \par \par The risks, benefits and alternatives of catheter angiogram were discussed with the patient in detail. In my personal experience, the risks are very rare, but the possibility is not zero. Risks include stroke, brain hemorrhage, any type of disability (facial or extremity weakness, facial or extremity numbness, speech difficulties, blindness) and others. There are also possible complications related to the incisions such as infection, pain, swelling and bleeding.\par  \par Plan:\par Catheter Venogram/Angiogram/BTO Tomorrow

## 2023-06-14 NOTE — ASU DISCHARGE PLAN (ADULT/PEDIATRIC) - CARE PROVIDER_API CALL
Aziza Epperson  Radiology  [address] [facility], [address]  [place] [address]  Phone: [phone]  Fax: [phone]  Follow Up Time:

## 2023-06-19 PROBLEM — Z87.898 PERSONAL HISTORY OF OTHER SPECIFIED CONDITIONS: Chronic | Status: ACTIVE | Noted: 2023-06-13

## 2023-06-19 PROBLEM — E78.5 HYPERLIPIDEMIA, UNSPECIFIED: Chronic | Status: ACTIVE | Noted: 2023-06-13

## 2023-06-19 PROBLEM — Z86.69 PERSONAL HISTORY OF OTHER DISEASES OF THE NERVOUS SYSTEM AND SENSE ORGANS: Chronic | Status: ACTIVE | Noted: 2023-06-13

## 2023-06-19 PROBLEM — N20.0 CALCULUS OF KIDNEY: Chronic | Status: ACTIVE | Noted: 2023-06-13

## 2023-12-12 ENCOUNTER — APPOINTMENT (OUTPATIENT)
Dept: NEUROSURGERY | Facility: CLINIC | Age: 63
End: 2023-12-12

## 2023-12-12 ENCOUNTER — APPOINTMENT (OUTPATIENT)
Dept: NEUROSURGERY | Facility: CLINIC | Age: 63
End: 2023-12-12
Payer: COMMERCIAL

## 2023-12-12 DIAGNOSIS — H93.A9 PULSATILE TINNITUS, UNSPECIFIED EAR: ICD-10-CM

## 2023-12-12 PROCEDURE — 99441: CPT

## 2023-12-12 PROCEDURE — 99213 OFFICE O/P EST LOW 20 MIN: CPT | Mod: 95

## 2023-12-13 PROBLEM — H93.A9 PULSATILE TINNITUS: Status: ACTIVE | Noted: 2022-06-13

## 2023-12-13 NOTE — ASSESSMENT
[FreeTextEntry1] : Impression: Bilateral (R>L) Pulsatile Tinnitus NO CHANGE with neck pressure on either side Chronic Headaches Worsening Dizziness  MRA 4/2022 reveals no dural AVF or AVM MRV 4/2022 imaging unclear MRV 10/2022 reveals a LEFT dominant system, no transverse sinus stenosis; left jugular bulb diverticulum  On 6/14/23, I performed a catheter venogram/BTO: 1. Catheter cerebral angiogram without evidence of aneurysm, arteriovenous malformation, dural arteriovenous fistula, abnormal arteriovenous shunting, arterial stenosis or other arterial cause of pulsatile tinnitus 2. Catheter cerebral venogram without evidence of venous sinus stenosis or venous sinus aneurysm. There is a small left jugular bulb diverticulum. There are prominent transmastoid emissary veins bilaterally. 3. Balloon test occlusion of the right and left transverse and sigmoid venous sinuses, the right and left transmastoid emissary veins and the left jugular bulb diverticulum did not result in significant improvement of pulsatile tinnitus. There was modest improvement of the right pulsatile tinnitus from 7 out of 10 down to 5 out of 10 with the balloon inflated into the right transverse and right sigmoid venous sinuses. The left pulsatile tinnitus remained unchanged. This improvement persisted even after the balloon was deflated. 4. Despite the presence of prominent transmastoid emissary veins bilaterally and a small left jugular bulb diverticulum, the balloon test occlusion did not support embolization.  Since our last visit, she reports a in the pulsatile tinnitus which she is unsure if that is real or if she is just coping with the sound better.  There is no further cerebrovascular evaluation to be done at this point.   Plan: Follow Up with Me As Needed

## 2023-12-13 NOTE — REASON FOR VISIT
[Home] : at home, [unfilled] , at the time of the visit. [Medical Office: ([facility])___] : at the medical office located in  [Patient] : the patient [Self] : self [Follow-Up: _____] : a [unfilled] follow-up visit
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
## 2025-01-01 NOTE — H&P PST ADULT - BP NONINVASIVE DIASTOLIC (MM HG)
N/A Patient is under age 18 and does not have a history of high risk behavior or is not high risk for Hep C 87